# Patient Record
(demographics unavailable — no encounter records)

---

## 2025-07-23 NOTE — HISTORY OF PRESENT ILLNESS
[FreeTextEntry1] : f/u hand eczema  [de-identified] : 45 y/o F presenting for f/u. Notes improvement of hands in the summer, used Vtama x 2 then stopped since sunlight helped. Also notes that she was diagnosed with occular rosacea after having recurrent chalazions, styes, and conjunctivitis.  Prior history: - Dupilumab X 8 months (partial response) - Upadacitinib X months (partial, though slightly higher response) - Did not tolerate ruxolitinib cream 2/2 burning sensation  - tralokinumab (partial response)  Social: homemaker

## 2025-07-23 NOTE — HISTORY OF PRESENT ILLNESS
[FreeTextEntry1] : f/u hand eczema  [de-identified] : 43 y/o F presenting for f/u. Notes improvement of hands in the summer, used Vtama x 2 then stopped since sunlight helped. Also notes that she was diagnosed with occular rosacea after having recurrent chalazions, styes, and conjunctivitis.  Prior history: - Dupilumab X 8 months (partial response) - Upadacitinib X months (partial, though slightly higher response) - Did not tolerate ruxolitinib cream 2/2 burning sensation  - tralokinumab (partial response)  Social: homemaker

## 2025-07-23 NOTE — ASSESSMENT
[FreeTextEntry1] : #Atopic dermatitis, hand eczema variant, chronic, stable, 4% BSA previously (though special site involvement of the hands), BSA 0% today - Partial responses to dupilumab, upadacitinib, and clobetasol - stopped ruxolitinib due to burning with cream application  - Reviewed risks (as well as mitigation strategies for adverse drug events as applicable), benefits, and alternatives of therapy. discussed consideration of tapinarof, excimer, and nemolizumab. will hold off on excimer for now, as she tends to improve in the summer months. also discussed possible home photobooth. may reconsider in the fall.  - Cont clobetasol when flaring BID 2 weeks on 1 week off - Cont Vtama when flaring BID  #Xerosis - Previously discussed liberal moisturizer use   #Rosacea, papulopustular and ETT type  Chronic, not at goal - Discussed benign but chronic nature of diagnosis and natural history  - Reviewed trigger avoidance including but not limited to heat, stress, alcohol, caffeine, sunlight, spicy foods  - Strict photoprotection with daily OTC broad-spectrum sunscreen and sun-protective behavior  - Discussed cosmetic nature of PDL, expectations, out of pocket fee, r/b and SEs. - START sulfacetamide sodium-sulfur 10-5 % suspension BID as needed. If not covered can use sulfa-lo bar OTC.  RTC 3 months